# Patient Record
Sex: FEMALE | Race: ASIAN | Employment: OTHER | URBAN - METROPOLITAN AREA
[De-identification: names, ages, dates, MRNs, and addresses within clinical notes are randomized per-mention and may not be internally consistent; named-entity substitution may affect disease eponyms.]

---

## 2017-01-09 ENCOUNTER — TELEPHONE (OUTPATIENT)
Dept: INTERNAL MEDICINE CLINIC | Facility: CLINIC | Age: 52
End: 2017-01-09

## 2017-01-13 ENCOUNTER — APPOINTMENT (OUTPATIENT)
Dept: LAB | Age: 52
End: 2017-01-13
Attending: FAMILY MEDICINE
Payer: COMMERCIAL

## 2017-01-13 ENCOUNTER — OFFICE VISIT (OUTPATIENT)
Dept: INTERNAL MEDICINE CLINIC | Facility: CLINIC | Age: 52
End: 2017-01-13

## 2017-01-13 VITALS
WEIGHT: 100 LBS | HEIGHT: 61 IN | RESPIRATION RATE: 16 BRPM | SYSTOLIC BLOOD PRESSURE: 100 MMHG | BODY MASS INDEX: 18.88 KG/M2 | HEART RATE: 54 BPM | DIASTOLIC BLOOD PRESSURE: 52 MMHG | TEMPERATURE: 98 F

## 2017-01-13 DIAGNOSIS — E55.9 VITAMIN D DEFICIENCY: ICD-10-CM

## 2017-01-13 DIAGNOSIS — Z01.419 ROUTINE GYNECOLOGICAL EXAMINATION: Primary | ICD-10-CM

## 2017-01-13 LAB — 25-HYDROXYVITAMIN D (TOTAL): 37.5 NG/ML (ref 30–100)

## 2017-01-13 PROCEDURE — 99396 PREV VISIT EST AGE 40-64: CPT | Performed by: FAMILY MEDICINE

## 2017-01-13 PROCEDURE — 82306 VITAMIN D 25 HYDROXY: CPT

## 2017-01-13 PROCEDURE — 88175 CYTOPATH C/V AUTO FLUID REDO: CPT | Performed by: FAMILY MEDICINE

## 2017-01-13 PROCEDURE — 87624 HPV HI-RISK TYP POOLED RSLT: CPT | Performed by: FAMILY MEDICINE

## 2017-01-13 NOTE — PATIENT INSTRUCTIONS
The Range of Pap Test Results  When your Pap test is sent to the lab, the lab studies your cell samples and reports any abnormal cell changes. Your health care provider can discuss these changes with you.  In some cases, an abnormal Pap test is due to an · Mild dysplasia: Cells show distinct changes. More testing or HPV typing may be done. You may also have treatment to destroy or remove problem cells. (Reported as low-grade YOSI or WERNER 1.)  · Moderate to severe dysplasia: Cells show precancerous changes.  O

## 2017-01-13 NOTE — PROGRESS NOTES
HPI:    Patient ID: Anshul Li is a 49year old female. HPI Here for pap smear only. Patient had usual annual check-up 4 months ago. Paps have been normal in the past. Due for mammogram and plans to have done soon. No complaints today.    Taking vi

## 2017-01-16 LAB — HPV I/H RISK 1 DNA SPEC QL NAA+PROBE: NEGATIVE

## 2017-01-17 LAB
LAST PAP RESULT: NORMAL
PAP HISTORY (OTHER THAN LAST PAP): NORMAL

## 2017-04-08 ENCOUNTER — HOSPITAL ENCOUNTER (EMERGENCY)
Facility: HOSPITAL | Age: 52
Discharge: HOME | End: 2017-04-08
Attending: EMERGENCY MEDICINE
Payer: COMMERCIAL

## 2017-04-08 ENCOUNTER — APPOINTMENT (OUTPATIENT)
Dept: GENERAL RADIOLOGY | Facility: HOSPITAL | Age: 52
End: 2017-04-08
Attending: EMERGENCY MEDICINE
Payer: COMMERCIAL

## 2017-04-08 VITALS
DIASTOLIC BLOOD PRESSURE: 75 MMHG | HEART RATE: 68 BPM | BODY MASS INDEX: 18.12 KG/M2 | RESPIRATION RATE: 16 BRPM | TEMPERATURE: 98 F | HEIGHT: 61 IN | OXYGEN SATURATION: 97 % | WEIGHT: 96 LBS | SYSTOLIC BLOOD PRESSURE: 128 MMHG

## 2017-04-08 DIAGNOSIS — S29.011A CHEST WALL MUSCLE STRAIN, INITIAL ENCOUNTER: Primary | ICD-10-CM

## 2017-04-08 PROCEDURE — 84484 ASSAY OF TROPONIN QUANT: CPT | Performed by: EMERGENCY MEDICINE

## 2017-04-08 PROCEDURE — 99285 EMERGENCY DEPT VISIT HI MDM: CPT

## 2017-04-08 PROCEDURE — 93010 ELECTROCARDIOGRAM REPORT: CPT

## 2017-04-08 PROCEDURE — 85378 FIBRIN DEGRADE SEMIQUANT: CPT | Performed by: EMERGENCY MEDICINE

## 2017-04-08 PROCEDURE — 93005 ELECTROCARDIOGRAM TRACING: CPT

## 2017-04-08 PROCEDURE — 85025 COMPLETE CBC W/AUTO DIFF WBC: CPT | Performed by: EMERGENCY MEDICINE

## 2017-04-08 PROCEDURE — 36415 COLL VENOUS BLD VENIPUNCTURE: CPT

## 2017-04-08 PROCEDURE — 71101 X-RAY EXAM UNILAT RIBS/CHEST: CPT

## 2017-04-08 RX ORDER — CYCLOBENZAPRINE HCL 10 MG
10 TABLET ORAL 3 TIMES DAILY PRN
Qty: 20 TABLET | Refills: 0 | Status: SHIPPED | OUTPATIENT
Start: 2017-04-08 | End: 2017-04-15

## 2017-04-08 RX ORDER — ASPIRIN 81 MG/1
TABLET, CHEWABLE ORAL DAILY
COMMUNITY
End: 2019-05-24

## 2017-04-08 RX ORDER — IBUPROFEN 600 MG/1
600 TABLET ORAL ONCE
Status: DISCONTINUED | OUTPATIENT
Start: 2017-04-08 | End: 2017-04-08

## 2017-04-08 RX ORDER — RIBOFLAVIN (VITAMIN B2) 100 MG
100 TABLET ORAL DAILY
COMMUNITY

## 2017-04-08 NOTE — ED PROVIDER NOTES
Patient Seen in: [facility] Emergency Department    History   Patient presents with:  Trauma (cardiovascular, musculoskeletal)    Stated Complaint: rib pain    HPI    This is a 15-year-old female who states that she was moving brakes a few days ago Chew by mouth daily. Cyclobenzaprine HCl 10 MG Oral Tab,  Take 1 tablet (10 mg total) by mouth 3 (three) times daily as needed for Muscle spasms. Cholecalciferol (VITAMIN D-3 OR),  Take by mouth. No family history on file.       Smoking Status: Ne D-DIMER - Normal    Narrative:      FEU = Fibrinogen Equivalent Units.     In non-pregnant females:  D-Dimer results of less than 0.5 ug/mL (FEU) have been shown to contribute to  the exclusion of venous thrombolism with a negative predictive value of  ap approximately 600 miles according to her and the some the pain was pleuritic but the pain started even before the drive itself. But workup here is essentially negative she feels comfortable going home she is a low risk otherwise for any acute pathology.

## 2017-04-08 NOTE — ED INITIAL ASSESSMENT (HPI)
PT WAS MOVING SOME BRICKS A FEW DAYS AGO AND FELT A POP IN HER L RIB. PT C/O RIB PAIN AND DIFFICULTY TAKING A DEEP BREATH.

## 2017-12-11 ENCOUNTER — TELEPHONE (OUTPATIENT)
Dept: INTERNAL MEDICINE CLINIC | Facility: CLINIC | Age: 52
End: 2017-12-11

## 2017-12-11 DIAGNOSIS — Z12.39 ENCOUNTER FOR SCREENING FOR MALIGNANT NEOPLASM OF BREAST: ICD-10-CM

## 2017-12-11 DIAGNOSIS — Z12.39 ENCOUNTER FOR OTHER SCREENING FOR MALIGNANT NEOPLASM OF BREAST: Primary | ICD-10-CM

## 2017-12-12 NOTE — TELEPHONE ENCOUNTER
Evert notified. I gave Favian Michael the central scheduling phone number as well. I advised him that there re other locations available to do the mammogram at besides the main hospital.   He thanked us for the call and had no further questions.

## 2017-12-30 ENCOUNTER — HOSPITAL ENCOUNTER (OUTPATIENT)
Dept: MAMMOGRAPHY | Facility: HOSPITAL | Age: 52
Discharge: HOME | End: 2017-12-30
Attending: FAMILY MEDICINE
Payer: COMMERCIAL

## 2017-12-30 DIAGNOSIS — Z12.39 ENCOUNTER FOR SCREENING FOR MALIGNANT NEOPLASM OF BREAST: ICD-10-CM

## 2017-12-30 PROCEDURE — 77067 SCR MAMMO BI INCL CAD: CPT | Performed by: FAMILY MEDICINE

## 2019-05-24 ENCOUNTER — OFFICE VISIT (OUTPATIENT)
Dept: INTERNAL MEDICINE CLINIC | Facility: CLINIC | Age: 54
End: 2019-05-24
Payer: COMMERCIAL

## 2019-05-24 VITALS
BODY MASS INDEX: 18.5 KG/M2 | DIASTOLIC BLOOD PRESSURE: 64 MMHG | SYSTOLIC BLOOD PRESSURE: 110 MMHG | WEIGHT: 98 LBS | HEART RATE: 68 BPM | RESPIRATION RATE: 16 BRPM | HEIGHT: 61 IN

## 2019-05-24 DIAGNOSIS — E55.9 VITAMIN D DEFICIENCY: ICD-10-CM

## 2019-05-24 DIAGNOSIS — Z13.220 SCREENING, LIPID: ICD-10-CM

## 2019-05-24 DIAGNOSIS — Z13.0 SCREENING FOR DEFICIENCY ANEMIA: ICD-10-CM

## 2019-05-24 DIAGNOSIS — Z00.00 ANNUAL PHYSICAL EXAM: Primary | ICD-10-CM

## 2019-05-24 DIAGNOSIS — Z12.39 SCREENING FOR BREAST CANCER: ICD-10-CM

## 2019-05-24 DIAGNOSIS — R12 HEARTBURN: ICD-10-CM

## 2019-05-24 DIAGNOSIS — Z13.1 SCREENING FOR DIABETES MELLITUS: ICD-10-CM

## 2019-05-24 PROCEDURE — 99396 PREV VISIT EST AGE 40-64: CPT | Performed by: FAMILY MEDICINE

## 2019-05-24 RX ORDER — PANTOPRAZOLE SODIUM 20 MG/1
20 TABLET, DELAYED RELEASE ORAL
Qty: 30 TABLET | Refills: 0 | Status: SHIPPED | OUTPATIENT
Start: 2019-05-24 | End: 2019-09-16

## 2019-05-24 NOTE — PROGRESS NOTES
HPI:    Patient ID: Chhaya Pike is a 47year old female. HPI Here for annual check-up. Patient is traveling a lot back and forth from her country to here. Getting her green card here. Has continued to have some intermittent heartburn symptoms.  Ta disturbance. Respiratory: Negative for cough and shortness of breath. Cardiovascular: Negative for chest pain, palpitations and leg swelling. Gastrointestinal: Negative for abdominal pain and constipation.    Endocrine: Negative for polydipsia, polyp deficiency  Screening for breast cancer  Heartburn  1-5. Reviewed preventive health recommendations with patient. Check labs. Encouraged regular exercise and healthy eating. Do mammogram and colonoscopy. 6. Rx PPI for one month.  If symptoms return would

## 2019-05-24 NOTE — PATIENT INSTRUCTIONS
Prevention Guidelines, Women Ages 48 to 59  Screening tests and vaccines are an important part of managing your health. A screening test is done to find possible disorders or diseases in people who don't have any symptoms.  The goal is to find a disease e Chlamydia Women at increased risk for infection At routine exams   Colorectal cancer All women in this age group Flexible sigmoidoscopy every 5 years, or colonoscopy every 10 years, or double-contrast barium enema every 5 years; yearly fecal occult blood t Hepatitis B Women at increased risk for infection – talk with your healthcare provider 3 doses over 6 months; second dose should be given 1 month after the first dose; the third dose should be given at least 2 months after the second dose and at least 4 mo Use of daily aspirin Women ages 54 and up in this age group who are at risk for cardiovascular health problems such as stroke When your risk is known   Use of tobacco and the health effects it can cause All women in this age group Every exam   [address]

## 2019-05-25 ENCOUNTER — LAB ENCOUNTER (OUTPATIENT)
Dept: LAB | Age: 54
End: 2019-05-25
Attending: FAMILY MEDICINE
Payer: COMMERCIAL

## 2019-05-25 DIAGNOSIS — Z13.1 SCREENING FOR DIABETES MELLITUS: ICD-10-CM

## 2019-05-25 DIAGNOSIS — Z13.0 SCREENING FOR DEFICIENCY ANEMIA: ICD-10-CM

## 2019-05-25 DIAGNOSIS — Z13.220 SCREENING, LIPID: ICD-10-CM

## 2019-05-25 DIAGNOSIS — E55.9 VITAMIN D DEFICIENCY: ICD-10-CM

## 2019-05-25 PROCEDURE — 80061 LIPID PANEL: CPT

## 2019-05-25 PROCEDURE — 85025 COMPLETE CBC W/AUTO DIFF WBC: CPT

## 2019-05-25 PROCEDURE — 80053 COMPREHEN METABOLIC PANEL: CPT

## 2019-05-25 PROCEDURE — 82306 VITAMIN D 25 HYDROXY: CPT

## 2019-06-10 ENCOUNTER — APPOINTMENT (OUTPATIENT)
Dept: LAB | Age: 54
End: 2019-06-10
Attending: FAMILY MEDICINE
Payer: COMMERCIAL

## 2019-06-10 ENCOUNTER — OFFICE VISIT (OUTPATIENT)
Dept: INTERNAL MEDICINE CLINIC | Facility: CLINIC | Age: 54
End: 2019-06-10
Payer: COMMERCIAL

## 2019-06-10 VITALS
TEMPERATURE: 97 F | HEIGHT: 61 IN | DIASTOLIC BLOOD PRESSURE: 84 MMHG | SYSTOLIC BLOOD PRESSURE: 116 MMHG | HEART RATE: 64 BPM | BODY MASS INDEX: 18.43 KG/M2 | RESPIRATION RATE: 15 BRPM | WEIGHT: 97.63 LBS

## 2019-06-10 DIAGNOSIS — R74.8 ELEVATED LIVER ENZYMES: Primary | ICD-10-CM

## 2019-06-10 DIAGNOSIS — E78.5 HYPERLIPIDEMIA, UNSPECIFIED HYPERLIPIDEMIA TYPE: ICD-10-CM

## 2019-06-10 DIAGNOSIS — E55.9 VITAMIN D DEFICIENCY: ICD-10-CM

## 2019-06-10 DIAGNOSIS — R74.8 ELEVATED LIVER ENZYMES: ICD-10-CM

## 2019-06-10 PROCEDURE — 80053 COMPREHEN METABOLIC PANEL: CPT | Performed by: FAMILY MEDICINE

## 2019-06-10 PROCEDURE — 99214 OFFICE O/P EST MOD 30 MIN: CPT | Performed by: FAMILY MEDICINE

## 2019-06-10 NOTE — PROGRESS NOTES
HPI:    Patient ID: Oralia Bob is a 47year old female. HPI Here for lab results. Patient has no complaints. Doesn't typically drink a lot of alcohol. Hasn't had elevated liver enzymes in the past that she is aware of. Not taking any supplements. Pulmonary/Chest: Effort normal.   Neurological: She is alert. Psychiatric: She has a normal mood and affect. Her behavior is normal.   Vitals reviewed.              ASSESSMENT/PLAN:   Elevated liver enzymes  (primary encounter diagnosis)  Vitamin d defi

## 2019-06-10 NOTE — PATIENT INSTRUCTIONS
Start taking Vitamin D3 5000IU daily. Recheck vitamin D in 3 months. Decrease saturated fat and increase exercise. Recheck cholesterol in 3 months.    We will let you know your liver enzyme results and what testing we need to do next in the next couple of

## 2019-06-11 DIAGNOSIS — R74.8 ELEVATED LIVER ENZYMES: Primary | ICD-10-CM

## 2019-06-29 ENCOUNTER — APPOINTMENT (OUTPATIENT)
Dept: LAB | Age: 54
End: 2019-06-29
Attending: FAMILY MEDICINE
Payer: COMMERCIAL

## 2019-06-29 DIAGNOSIS — R74.8 ELEVATED LIVER ENZYMES: ICD-10-CM

## 2019-06-29 LAB
ALBUMIN SERPL-MCNC: 4 G/DL (ref 3.4–5)
ALBUMIN/GLOB SERPL: 1 {RATIO} (ref 1–2)
ALP LIVER SERPL-CCNC: 99 U/L (ref 41–108)
ALT SERPL-CCNC: 47 U/L (ref 13–56)
ANION GAP SERPL CALC-SCNC: 5 MMOL/L (ref 0–18)
AST SERPL-CCNC: 34 U/L (ref 15–37)
BILIRUB SERPL-MCNC: 0.4 MG/DL (ref 0.1–2)
BUN BLD-MCNC: 13 MG/DL (ref 7–18)
BUN/CREAT SERPL: 16.9 (ref 10–20)
CALCIUM BLD-MCNC: 9.7 MG/DL (ref 8.5–10.1)
CHLORIDE SERPL-SCNC: 104 MMOL/L (ref 98–112)
CO2 SERPL-SCNC: 29 MMOL/L (ref 21–32)
CREAT BLD-MCNC: 0.77 MG/DL (ref 0.55–1.02)
GLOBULIN PLAS-MCNC: 4.2 G/DL (ref 2.8–4.4)
GLUCOSE BLD-MCNC: 92 MG/DL (ref 70–99)
M PROTEIN MFR SERPL ELPH: 8.2 G/DL (ref 6.4–8.2)
OSMOLALITY SERPL CALC.SUM OF ELEC: 286 MOSM/KG (ref 275–295)
POTASSIUM SERPL-SCNC: 4.5 MMOL/L (ref 3.5–5.1)
SODIUM SERPL-SCNC: 138 MMOL/L (ref 136–145)

## 2019-06-29 PROCEDURE — 80053 COMPREHEN METABOLIC PANEL: CPT | Performed by: FAMILY MEDICINE

## 2019-09-16 ENCOUNTER — OFFICE VISIT (OUTPATIENT)
Dept: SURGERY | Facility: CLINIC | Age: 54
End: 2019-09-16
Payer: COMMERCIAL

## 2019-09-16 VITALS
HEART RATE: 60 BPM | BODY MASS INDEX: 19 KG/M2 | DIASTOLIC BLOOD PRESSURE: 62 MMHG | SYSTOLIC BLOOD PRESSURE: 97 MMHG | WEIGHT: 98 LBS

## 2019-09-16 DIAGNOSIS — Z12.11 SCREENING FOR COLON CANCER: Primary | ICD-10-CM

## 2019-09-16 PROCEDURE — S0285 CNSLT BEFORE SCREEN COLONOSC: HCPCS | Performed by: COLON & RECTAL SURGERY

## 2019-09-16 NOTE — H&P
New Patient Visit Note       Active Problems      1.  Screening for colon cancer        Chief Complaint   Patient presents with:  Colonoscopy: NP. REFERRED BY  FOR COLONOSCOPY CONSULT - no previous exam, denies abd pain, rectal bleeding, or change in making. Trevin Luciano PA-C    I agree with the note as scribed by the PA  I performed my own physical exam and obtained the history as detailed above.   I have made all appropriate changes in documentation as necessary  I attest to the accuracy of thi Spouse name: Not on file      Number of children: Not on file      Years of education: Not on file      Highest education level: Not on file    Tobacco Use      Smoking status: Never Smoker      Smokeless tobacco: Never Used    Substance and Sexual Activ well-nourished. No distress. HENT:   Head: Normocephalic and atraumatic. Nose: Nose normal.   Mouth/Throat: Oropharynx is clear and moist.   Eyes: Pupils are equal, round, and reactive to light. Conjunctivae and EOM are normal. No scleral icterus.    Ne deep cervical adenopathy present. Right: No inguinal and no supraclavicular adenopathy present. Left: No inguinal and no supraclavicular adenopathy present. Neurological: She is alert and oriented to person, place, and time.    Skin: Skin is houses. Clinical examination of the abdomen reveals it to be soft, nondistended, nontender, bowel sounds are normal activity and normal pitch. There is no rebounding tenderness or guarding. There are no signs of ascites or peritonitis.   Liver and sple

## 2019-09-16 NOTE — PATIENT INSTRUCTIONS
Lennox Nguyen is a 20-year-old female who presents today in consultation for a colonoscopy. The patient has never had a colonoscopy in the past and states that she was instructed by her primary care provider that she was due for a colonoscopy.     The with the patient. All questions from the patient were answered in detail. A description of the procedure and it's possible outcomes was fully discussed. The patient seemed to understand the conversation and its details.     Consent for surgery was confir

## 2019-09-17 RX ORDER — POLYETHYLENE GLYCOL 3350, SODIUM CHLORIDE, SODIUM BICARBONATE, POTASSIUM CHLORIDE 420; 11.2; 5.72; 1.48 G/4L; G/4L; G/4L; G/4L
POWDER, FOR SOLUTION ORAL
Qty: 1 BOTTLE | Refills: 0 | Status: SHIPPED | OUTPATIENT
Start: 2019-09-17 | End: 2020-07-06 | Stop reason: ALTCHOICE

## 2019-09-26 ENCOUNTER — TELEPHONE (OUTPATIENT)
Dept: SURGERY | Facility: CLINIC | Age: 54
End: 2019-09-26

## 2019-09-26 NOTE — TELEPHONE ENCOUNTER
Patient's  called with concerns of his wife finishing the trilyte prep.  states that he knows that she will have a difficult time finishing it because she is very \"sensitive to certain tastes. \" Patient's  requested an alternative.  I

## 2019-10-01 PROCEDURE — 88305 TISSUE EXAM BY PATHOLOGIST: CPT | Performed by: COLON & RECTAL SURGERY

## 2019-10-02 ENCOUNTER — LAB REQUISITION (OUTPATIENT)
Dept: LAB | Facility: HOSPITAL | Age: 54
End: 2019-10-02
Payer: COMMERCIAL

## 2019-10-02 DIAGNOSIS — C18.9 MALIGNANT NEOPLASM OF COLON, UNSPECIFIED (HCC): ICD-10-CM

## 2019-10-18 ENCOUNTER — HOSPITAL ENCOUNTER (OUTPATIENT)
Dept: MAMMOGRAPHY | Facility: HOSPITAL | Age: 54
Discharge: HOME | End: 2019-10-18
Attending: FAMILY MEDICINE
Payer: COMMERCIAL

## 2019-10-18 DIAGNOSIS — Z12.39 SCREENING FOR BREAST CANCER: ICD-10-CM

## 2019-10-18 PROCEDURE — 77067 SCR MAMMO BI INCL CAD: CPT | Performed by: FAMILY MEDICINE

## 2019-10-18 PROCEDURE — 77063 BREAST TOMOSYNTHESIS BI: CPT | Performed by: FAMILY MEDICINE

## 2019-10-25 ENCOUNTER — PATIENT OUTREACH (OUTPATIENT)
Dept: SURGERY | Facility: CLINIC | Age: 54
End: 2019-10-25

## 2020-06-23 ENCOUNTER — TELEPHONE (OUTPATIENT)
Dept: INTERNAL MEDICINE CLINIC | Facility: CLINIC | Age: 55
End: 2020-06-23

## 2020-06-23 DIAGNOSIS — E55.9 VITAMIN D DEFICIENCY: ICD-10-CM

## 2020-06-23 DIAGNOSIS — Z13.220 SCREENING, LIPID: ICD-10-CM

## 2020-06-23 DIAGNOSIS — Z00.00 ANNUAL PHYSICAL EXAM: Primary | ICD-10-CM

## 2020-06-23 DIAGNOSIS — E78.5 HYPERLIPIDEMIA, UNSPECIFIED HYPERLIPIDEMIA TYPE: ICD-10-CM

## 2020-06-23 DIAGNOSIS — Z13.228 SCREENING FOR METABOLIC DISORDER: ICD-10-CM

## 2020-06-23 DIAGNOSIS — Z13.0 SCREENING FOR DEFICIENCY ANEMIA: ICD-10-CM

## 2020-06-23 NOTE — TELEPHONE ENCOUNTER
Future Appointments   Date Time Provider [facility]   7/8/2020  4:30 PM Charline Hinds, DO [facility]     For CPE      Pt would like fasting labs sent to [facility] pls. Pt aware to complete 5-7 days ahead.

## 2020-07-06 ENCOUNTER — TELEPHONE (OUTPATIENT)
Dept: INTERNAL MEDICINE CLINIC | Facility: CLINIC | Age: 55
End: 2020-07-06

## 2020-07-06 NOTE — TELEPHONE ENCOUNTER
Pt's  called to say Pt has had  left side under and around breast pain, been going on for a week.      Future Appointments   Date Time Provider [facility]   7/7/2020 11:00 AM Everardo Herrera, DO [facility]     For CPE and breast/ches

## 2020-07-06 NOTE — TELEPHONE ENCOUNTER
Pt's  called back to ask us to cancel her appt with [facility] 7/7/2020    They will be going to a new internist. They were able to get her in today     Not sure if she will come back to [facility].

## 2022-06-13 ENCOUNTER — TELEPHONE (OUTPATIENT)
Dept: INTERNAL MEDICINE CLINIC | Facility: CLINIC | Age: 57
End: 2022-06-13

## 2022-06-13 NOTE — TELEPHONE ENCOUNTER
calling for wife who is positive for covid. Symptoms started on 6/10/22 headaches, congestion, cough, body aches. Tested positive on 6/12/22. Pt vaccinated with one booster.  requesting medication or recommendations on how to treat at home.

## 2022-06-13 NOTE — TELEPHONE ENCOUNTER
MONTSE    Pt phone, vmail not setup yet. Called Peter's phone,  on HIPPA. Spoke to Delilah. Advised of OTC recommendations for cough/discomfort, isolation requirements, rest, and hydration. Warning signs given. Pt intially had symptoms below- currently has cough only.  states improving from symptom onset 6/10.

## 2023-03-20 ENCOUNTER — TELEPHONE (OUTPATIENT)
Dept: INTERNAL MEDICINE CLINIC | Facility: CLINIC | Age: 58
End: 2023-03-20

## 2023-03-20 NOTE — TELEPHONE ENCOUNTER
Called and spoke to pt and , Frank Beck. Per pt, she woke up with some dizziness this morning. Would occur with position changes or when moving head quickly. Has had this in the past, and previously had nausea/HA, but this did not occur this time. No current sxs, will come/go. Offered sooner appt tomorrow.  asked for something Wednesday as they cannot make it tomorrow. Appt moved to Wednesday at 3:45.      FYI to [facility]

## 2023-03-20 NOTE — TELEPHONE ENCOUNTER
Pt's spouse (peter- on hipaa) stated pt woke with dizziness.   Lawanda Ibrahim stated there may be a language barrier and requesting we call back after 4pm when he will be with her    Pt scheduled on below with [facility] to reestablish care    Future Appointments   Date Time Provider [facility]   3/27/2023  3:30 PM Marciano Christianson DO [facility]

## 2023-03-22 ENCOUNTER — OFFICE VISIT (OUTPATIENT)
Dept: INTERNAL MEDICINE CLINIC | Facility: CLINIC | Age: 58
End: 2023-03-22
Payer: COMMERCIAL

## 2023-03-22 VITALS
DIASTOLIC BLOOD PRESSURE: 64 MMHG | WEIGHT: 101 LBS | OXYGEN SATURATION: 100 % | HEIGHT: 60 IN | SYSTOLIC BLOOD PRESSURE: 120 MMHG | BODY MASS INDEX: 19.83 KG/M2 | HEART RATE: 69 BPM

## 2023-03-22 DIAGNOSIS — Z13.0 SCREENING FOR DEFICIENCY ANEMIA: ICD-10-CM

## 2023-03-22 DIAGNOSIS — H81.11 BENIGN PAROXYSMAL POSITIONAL VERTIGO OF RIGHT EAR: Primary | ICD-10-CM

## 2023-03-22 DIAGNOSIS — Z13.220 SCREENING, LIPID: ICD-10-CM

## 2023-03-22 DIAGNOSIS — Z13.1 SCREENING FOR DIABETES MELLITUS: ICD-10-CM

## 2023-03-22 DIAGNOSIS — Z12.11 SCREENING FOR MALIGNANT NEOPLASM OF COLON: ICD-10-CM

## 2023-03-22 DIAGNOSIS — E55.9 VITAMIN D DEFICIENCY: ICD-10-CM

## 2023-03-22 DIAGNOSIS — D12.6 ADENOMATOUS POLYP OF COLON, UNSPECIFIED PART OF COLON: ICD-10-CM

## 2023-03-22 PROCEDURE — 3008F BODY MASS INDEX DOCD: CPT | Performed by: FAMILY MEDICINE

## 2023-03-22 PROCEDURE — 3078F DIAST BP <80 MM HG: CPT | Performed by: FAMILY MEDICINE

## 2023-03-22 PROCEDURE — 99204 OFFICE O/P NEW MOD 45 MIN: CPT | Performed by: FAMILY MEDICINE

## 2023-03-22 PROCEDURE — 3074F SYST BP LT 130 MM HG: CPT | Performed by: FAMILY MEDICINE

## 2023-03-22 RX ORDER — MECLIZINE HYDROCHLORIDE 25 MG/1
25 TABLET ORAL 3 TIMES DAILY PRN
Qty: 30 TABLET | Refills: 0 | Status: SHIPPED | OUTPATIENT
Start: 2023-03-22 | End: 2023-03-24

## 2023-03-23 ENCOUNTER — TELEPHONE (OUTPATIENT)
Dept: INTERNAL MEDICINE CLINIC | Facility: CLINIC | Age: 58
End: 2023-03-23

## 2023-03-24 RX ORDER — MECLIZINE HYDROCHLORIDE 25 MG/1
25 TABLET ORAL 3 TIMES DAILY PRN
Qty: 30 TABLET | Refills: 0 | Status: SHIPPED | OUTPATIENT
Start: 2023-03-24

## 2023-03-24 NOTE — TELEPHONE ENCOUNTER
Last visit- 03/22/2023 benign paroxysmal positional vertigo of right ear    Last refill- 03/22/2023 meclizine 25mg QTY30 0R    Last labs- no recent labs   Future Appointments   Date Time Provider [facility]   4/12/2023  1:30 PM Cheo Pinon DO [facility]

## 2023-04-10 ENCOUNTER — LAB ENCOUNTER (OUTPATIENT)
Dept: LAB | Age: 58
End: 2023-04-10
Attending: FAMILY MEDICINE
Payer: COMMERCIAL

## 2023-04-10 DIAGNOSIS — Z13.1 SCREENING FOR DIABETES MELLITUS: ICD-10-CM

## 2023-04-10 DIAGNOSIS — Z13.0 SCREENING FOR DEFICIENCY ANEMIA: ICD-10-CM

## 2023-04-10 DIAGNOSIS — Z13.220 SCREENING, LIPID: ICD-10-CM

## 2023-04-10 DIAGNOSIS — E55.9 VITAMIN D DEFICIENCY: ICD-10-CM

## 2023-04-10 DIAGNOSIS — R73.01 IMPAIRED FASTING GLUCOSE: ICD-10-CM

## 2023-04-10 LAB
ALBUMIN SERPL-MCNC: 3.9 G/DL (ref 3.4–5)
ALBUMIN/GLOB SERPL: 0.9 {RATIO} (ref 1–2)
ALP LIVER SERPL-CCNC: 94 U/L
ALT SERPL-CCNC: 57 U/L
ANION GAP SERPL CALC-SCNC: 7 MMOL/L (ref 0–18)
AST SERPL-CCNC: 48 U/L (ref 15–37)
BASOPHILS # BLD AUTO: 0.02 X10(3) UL (ref 0–0.2)
BASOPHILS NFR BLD AUTO: 0.3 %
BILIRUB SERPL-MCNC: 0.6 MG/DL (ref 0.1–2)
BUN BLD-MCNC: 12 MG/DL (ref 7–18)
CALCIUM BLD-MCNC: 9.7 MG/DL (ref 8.5–10.1)
CHLORIDE SERPL-SCNC: 107 MMOL/L (ref 98–112)
CHOLEST SERPL-MCNC: 297 MG/DL (ref ?–200)
CO2 SERPL-SCNC: 28 MMOL/L (ref 21–32)
CREAT BLD-MCNC: 0.81 MG/DL
EOSINOPHIL # BLD AUTO: 0.1 X10(3) UL (ref 0–0.7)
EOSINOPHIL NFR BLD AUTO: 1.6 %
ERYTHROCYTE [DISTWIDTH] IN BLOOD BY AUTOMATED COUNT: 13.3 %
FASTING PATIENT LIPID ANSWER: YES
FASTING STATUS PATIENT QL REPORTED: YES
GFR SERPLBLD BASED ON 1.73 SQ M-ARVRAT: 85 ML/MIN/1.73M2 (ref 60–?)
GLOBULIN PLAS-MCNC: 4.5 G/DL (ref 2.8–4.4)
GLUCOSE BLD-MCNC: 113 MG/DL (ref 70–99)
HCT VFR BLD AUTO: 46.3 %
HDLC SERPL-MCNC: 114 MG/DL (ref 40–59)
HGB BLD-MCNC: 14.7 G/DL
IMM GRANULOCYTES # BLD AUTO: 0.01 X10(3) UL (ref 0–1)
IMM GRANULOCYTES NFR BLD: 0.2 %
LDLC SERPL CALC-MCNC: 159 MG/DL (ref ?–100)
LYMPHOCYTES # BLD AUTO: 3.32 X10(3) UL (ref 1–4)
LYMPHOCYTES NFR BLD AUTO: 53 %
MCH RBC QN AUTO: 29.6 PG (ref 26–34)
MCHC RBC AUTO-ENTMCNC: 31.7 G/DL (ref 31–37)
MCV RBC AUTO: 93.2 FL
MONOCYTES # BLD AUTO: 0.41 X10(3) UL (ref 0.1–1)
MONOCYTES NFR BLD AUTO: 6.5 %
NEUTROPHILS # BLD AUTO: 2.41 X10 (3) UL (ref 1.5–7.7)
NEUTROPHILS # BLD AUTO: 2.41 X10(3) UL (ref 1.5–7.7)
NEUTROPHILS NFR BLD AUTO: 38.4 %
NONHDLC SERPL-MCNC: 183 MG/DL (ref ?–130)
OSMOLALITY SERPL CALC.SUM OF ELEC: 295 MOSM/KG (ref 275–295)
PLATELET # BLD AUTO: 234 10(3)UL (ref 150–450)
POTASSIUM SERPL-SCNC: 4.6 MMOL/L (ref 3.5–5.1)
PROT SERPL-MCNC: 8.4 G/DL (ref 6.4–8.2)
RBC # BLD AUTO: 4.97 X10(6)UL
SODIUM SERPL-SCNC: 142 MMOL/L (ref 136–145)
TRIGL SERPL-MCNC: 141 MG/DL (ref 30–149)
VIT D+METAB SERPL-MCNC: 33 NG/ML (ref 30–100)
VLDLC SERPL CALC-MCNC: 27 MG/DL (ref 0–30)
WBC # BLD AUTO: 6.3 X10(3) UL (ref 4–11)

## 2023-04-10 PROCEDURE — 82306 VITAMIN D 25 HYDROXY: CPT | Performed by: FAMILY MEDICINE

## 2023-04-10 PROCEDURE — 85025 COMPLETE CBC W/AUTO DIFF WBC: CPT | Performed by: FAMILY MEDICINE

## 2023-04-10 PROCEDURE — 83036 HEMOGLOBIN GLYCOSYLATED A1C: CPT | Performed by: FAMILY MEDICINE

## 2023-04-10 PROCEDURE — 80053 COMPREHEN METABOLIC PANEL: CPT | Performed by: FAMILY MEDICINE

## 2023-04-10 PROCEDURE — 80061 LIPID PANEL: CPT | Performed by: FAMILY MEDICINE

## 2023-04-11 DIAGNOSIS — R73.01 IMPAIRED FASTING GLUCOSE: Primary | ICD-10-CM

## 2023-04-11 LAB
EST. AVERAGE GLUCOSE BLD GHB EST-MCNC: 131 MG/DL (ref 68–126)
HBA1C MFR BLD: 6.2 % (ref ?–5.7)

## 2023-04-12 ENCOUNTER — LAB ENCOUNTER (OUTPATIENT)
Dept: LAB | Age: 58
End: 2023-04-12
Attending: FAMILY MEDICINE
Payer: COMMERCIAL

## 2023-04-12 ENCOUNTER — OFFICE VISIT (OUTPATIENT)
Dept: INTERNAL MEDICINE CLINIC | Facility: CLINIC | Age: 58
End: 2023-04-12
Payer: COMMERCIAL

## 2023-04-12 VITALS
HEIGHT: 60 IN | DIASTOLIC BLOOD PRESSURE: 60 MMHG | WEIGHT: 98.63 LBS | HEART RATE: 68 BPM | SYSTOLIC BLOOD PRESSURE: 118 MMHG | BODY MASS INDEX: 19.36 KG/M2 | OXYGEN SATURATION: 100 %

## 2023-04-12 DIAGNOSIS — Z00.00 ANNUAL PHYSICAL EXAM: Primary | ICD-10-CM

## 2023-04-12 DIAGNOSIS — E78.00 PURE HYPERCHOLESTEROLEMIA: ICD-10-CM

## 2023-04-12 DIAGNOSIS — Z12.4 SCREENING FOR CERVICAL CANCER: ICD-10-CM

## 2023-04-12 DIAGNOSIS — R74.8 ELEVATED LIVER ENZYMES: ICD-10-CM

## 2023-04-12 DIAGNOSIS — R73.02 IMPAIRED GLUCOSE TOLERANCE: ICD-10-CM

## 2023-04-12 DIAGNOSIS — Z12.11 SCREENING FOR MALIGNANT NEOPLASM OF COLON: ICD-10-CM

## 2023-04-12 DIAGNOSIS — Z12.31 ENCOUNTER FOR SCREENING MAMMOGRAM FOR MALIGNANT NEOPLASM OF BREAST: ICD-10-CM

## 2023-04-12 LAB
HBV CORE AB SERPL QL IA: REACTIVE
HBV SURFACE AB SER QL: REACTIVE
HBV SURFACE AB SERPL IA-ACNC: 630.09 MIU/ML
HBV SURFACE AG SER-ACNC: <0.1 [IU]/L
HBV SURFACE AG SERPL QL IA: NONREACTIVE
HCV AB SERPL QL IA: NONREACTIVE
IRON SATN MFR SERPL: 27 %
IRON SERPL-MCNC: 95 UG/DL
TIBC SERPL-MCNC: 352 UG/DL (ref 240–450)
TRANSFERRIN SERPL-MCNC: 236 MG/DL (ref 200–360)
TSI SER-ACNC: 0.87 MIU/ML (ref 0.36–3.74)

## 2023-04-12 PROCEDURE — 3074F SYST BP LT 130 MM HG: CPT | Performed by: FAMILY MEDICINE

## 2023-04-12 PROCEDURE — 90750 HZV VACC RECOMBINANT IM: CPT | Performed by: FAMILY MEDICINE

## 2023-04-12 PROCEDURE — 86803 HEPATITIS C AB TEST: CPT | Performed by: FAMILY MEDICINE

## 2023-04-12 PROCEDURE — 99396 PREV VISIT EST AGE 40-64: CPT | Performed by: FAMILY MEDICINE

## 2023-04-12 PROCEDURE — 83550 IRON BINDING TEST: CPT | Performed by: FAMILY MEDICINE

## 2023-04-12 PROCEDURE — 86704 HEP B CORE ANTIBODY TOTAL: CPT | Performed by: FAMILY MEDICINE

## 2023-04-12 PROCEDURE — 99213 OFFICE O/P EST LOW 20 MIN: CPT | Performed by: FAMILY MEDICINE

## 2023-04-12 PROCEDURE — 3078F DIAST BP <80 MM HG: CPT | Performed by: FAMILY MEDICINE

## 2023-04-12 PROCEDURE — 87624 HPV HI-RISK TYP POOLED RSLT: CPT | Performed by: FAMILY MEDICINE

## 2023-04-12 PROCEDURE — 3008F BODY MASS INDEX DOCD: CPT | Performed by: FAMILY MEDICINE

## 2023-04-12 PROCEDURE — 87340 HEPATITIS B SURFACE AG IA: CPT | Performed by: FAMILY MEDICINE

## 2023-04-12 PROCEDURE — 84443 ASSAY THYROID STIM HORMONE: CPT | Performed by: FAMILY MEDICINE

## 2023-04-12 PROCEDURE — 83540 ASSAY OF IRON: CPT | Performed by: FAMILY MEDICINE

## 2023-04-12 PROCEDURE — 86706 HEP B SURFACE ANTIBODY: CPT | Performed by: FAMILY MEDICINE

## 2023-04-12 PROCEDURE — 90471 IMMUNIZATION ADMIN: CPT | Performed by: FAMILY MEDICINE

## 2023-04-13 LAB — HPV I/H RISK 1 DNA SPEC QL NAA+PROBE: NEGATIVE

## 2023-04-14 ENCOUNTER — TELEPHONE (OUTPATIENT)
Dept: INTERNAL MEDICINE CLINIC | Facility: CLINIC | Age: 58
End: 2023-04-14

## 2023-04-14 NOTE — TELEPHONE ENCOUNTER
Please call patient's spouse, Ileana Medina at [phone] around [place] [address].  Wife does not speak much [place].

## 2023-04-19 NOTE — TELEPHONE ENCOUNTER
Patient's  calling [phone] to ask several questions:    Lisa Hi asking if unprotected sex is ok, should pt be seeing a GI MD to check for liver damage, asking if pt can drink alcohol, other family members to have Hep B  Vaccines? Per Alexandre Bowen notified ok for unprotected sex is ok, see [facility] MD to f/u, no alcohol until seen by GI and addressed and other family members PCP's will need to be called to address questions about them in this situation, possibly their MD's will want to draw labs etc. Gerald San Fidel understanding.

## 2023-06-13 ENCOUNTER — HOSPITAL ENCOUNTER (OUTPATIENT)
Dept: MAMMOGRAPHY | Facility: HOSPITAL | Age: 58
Discharge: HOME | End: 2023-06-13
Attending: FAMILY MEDICINE
Payer: COMMERCIAL

## 2023-06-13 DIAGNOSIS — Z12.31 ENCOUNTER FOR SCREENING MAMMOGRAM FOR MALIGNANT NEOPLASM OF BREAST: ICD-10-CM

## 2023-06-13 PROCEDURE — 77063 BREAST TOMOSYNTHESIS BI: CPT | Performed by: FAMILY MEDICINE

## 2023-06-13 PROCEDURE — 77067 SCR MAMMO BI INCL CAD: CPT | Performed by: FAMILY MEDICINE

## 2023-08-10 ENCOUNTER — TELEPHONE (OUTPATIENT)
Dept: INTERNAL MEDICINE CLINIC | Facility: CLINIC | Age: 58
End: 2023-08-10

## 2023-08-10 DIAGNOSIS — Z23 NEED FOR SHINGLES VACCINE: Primary | ICD-10-CM

## 2023-08-10 NOTE — TELEPHONE ENCOUNTER
Future Appointments   Date Time Provider [facility]ibel [facility]   8/14/2023  8:30 AM [facility]     Pt coming for #2 shingles inj-please enter order

## 2023-08-14 ENCOUNTER — NURSE ONLY (OUTPATIENT)
Dept: INTERNAL MEDICINE CLINIC | Facility: CLINIC | Age: 58
End: 2023-08-14
Payer: COMMERCIAL

## 2023-08-14 PROCEDURE — 90471 IMMUNIZATION ADMIN: CPT | Performed by: FAMILY MEDICINE

## 2023-08-14 PROCEDURE — 90750 HZV VACC RECOMBINANT IM: CPT | Performed by: FAMILY MEDICINE

## 2024-10-15 ENCOUNTER — NURSE TRIAGE (OUTPATIENT)
Dept: INTERNAL MEDICINE CLINIC | Facility: CLINIC | Age: 59
End: 2024-10-15

## 2024-10-15 NOTE — TELEPHONE ENCOUNTER
Called and spoke to pt's spouse, Evert (on HIPAA). Offered appointment tomorrow morning with Jasmina Centeno or can be seen in [facility] today. Patient would like to come to see Jasmina Centeno tomorrow, appointment scheduled.

## 2024-10-15 NOTE — TELEPHONE ENCOUNTER
Action Requested: Summary for Provider     []  Critical Lab, Recommendations Needed  [] Need Additional Advice  []   FYI    []   Need Orders  [] Need Medications Sent to Pharmacy  []  Other     SUMMARY: sore throat  Triage call transferred.   Spoke with pt's  indicating pt c/o sore throat since yesterday. No availability with PCP today. Advised to take pt to [facility]. Per  would like pt to see another provider in clinic. Informed PCP prefers to see own pt and will send message to inquire if able to accommodate pt today or further recommendations.    verbalized understanding and agreed with POC.    Please advise. Thank you.    Reason for Disposition   Patient wants to be seen    Answer Assessment - Initial Assessment Questions  1. ONSET: \"When did the throat start hurting?\" (Hours or days ago)       10/14/24  2. SEVERITY: \"How bad is the sore throat?\" (Scale 1-10; mild, moderate or severe)    - MILD (1-3):  Doesn't interfere with eating or normal activities.    - MODERATE (4-7): Interferes with eating some solids and normal activities.    - SEVERE (8-10):  Excruciating pain, interferes with most normal activities.    - SEVERE WITH DYSPHAGIA (10): Can't swallow liquids, drooling.        moderate  3. STREP EXPOSURE: \"Has there been any exposure to strep within the past week?\" If Yes, ask: \"What type of contact occurred?\"        No   4.  VIRAL SYMPTOMS: \"Are there any symptoms of a cold, such as a runny nose, cough, hoarse voice or red eyes?\"        No   5. FEVER: \"Do you have a fever?\" If Yes, ask: \"What is your temperature, how was it measured, and when did it start?\"      No   6. PUS ON THE TONSILS: \"Is there pus on the tonsils in the back of your throat?\"       No   7. OTHER SYMPTOMS: \"Do you have any other symptoms?\" (e.g., difficulty breathing, headache, rash)      No   8. PREGNANCY: \"Is there any chance you are pregnant?\" \"When was your last menstrual period?\"      No    Protocols used: Sore  Throat-A-OH

## 2024-10-16 ENCOUNTER — OFFICE VISIT (OUTPATIENT)
Dept: INTERNAL MEDICINE CLINIC | Facility: CLINIC | Age: 59
End: 2024-10-16
Payer: COMMERCIAL

## 2024-10-16 VITALS
SYSTOLIC BLOOD PRESSURE: 110 MMHG | DIASTOLIC BLOOD PRESSURE: 80 MMHG | WEIGHT: 97.63 LBS | RESPIRATION RATE: 21 BRPM | TEMPERATURE: 98 F | HEIGHT: 60 IN | HEART RATE: 76 BPM | OXYGEN SATURATION: 100 % | BODY MASS INDEX: 19.17 KG/M2

## 2024-10-16 DIAGNOSIS — Z23 NEED FOR VACCINATION: ICD-10-CM

## 2024-10-16 DIAGNOSIS — Z12.11 COLON CANCER SCREENING: ICD-10-CM

## 2024-10-16 DIAGNOSIS — Z12.31 ENCOUNTER FOR SCREENING MAMMOGRAM FOR MALIGNANT NEOPLASM OF BREAST: ICD-10-CM

## 2024-10-16 DIAGNOSIS — B34.9 VIRAL ILLNESS: Primary | ICD-10-CM

## 2024-10-16 NOTE — PROGRESS NOTES
Vickie Wesley is a 59 year old female.   Chief Complaint   Patient presents with    Sore Throat     Yb rm  15  sore throat runny nose only on left  clear  3 days for sore throat        HPI:    Patient here today with her  for evaluation of runny nose for last 10 days and onset of sore throat 3 days ago. Patient denies body aches, chills, fever, fatigue. Does feel drainage in back of throat. Tolerating PO ok. Has not taken any over the counter medication as symptoms have been mild. No headaches, ear pain, hearing changes reported.     Due for mammogram and colonoscopy. Due for annual health visit.  Allergies:  Allergies[1]   Current Meds:  Current Outpatient Medications   Medication Sig Dispense Refill    meclizine 25 MG Oral Tab Take 1 tablet (25 mg total) by mouth 3 (three) times daily as needed. (Patient not taking: Reported on 10/16/2024) 30 tablet 0        PMH:     Past Medical History:    Abdominal pain, epigastric    Acute appendicitis without mention of peritonitis    Candidiasis of mouth    Contact dermatitis and other eczema, due to unspecified cause    Disorder of bone and cartilage, unspecified    GERD (gastroesophageal reflux disease)    Impaired glucose tolerance test    Osteopenia    Peptic ulcer disease    Peptic ulcer disease    Pure hypercholesterolemia    Ranula of floor of mouth    Routine general medical examination at a health care facility    Routine gynecological examination    Special screening for malignant neoplasms, colon    Special screening for malignant neoplasms, colon    Tachypnea    Ulcer    Vertigo       ROS:   Review of Systems   Constitutional: Negative.    HENT:  Positive for postnasal drip, rhinorrhea and sore throat.    Cardiovascular: Negative.    Gastrointestinal: Negative.    Neurological: Negative.         PHYSICAL EXAM:    /80   Pulse 76   Temp 98 °F (36.7 °C) (Temporal)   Resp 21   Ht 5' (1.524 m)   Wt 97 lb 9.6 oz (44.3 kg)   SpO2 100%   BMI 19.06  kg/m²   Physical Exam  Constitutional:       Appearance: Normal appearance.   HENT:      Right Ear: Tympanic membrane normal.      Left Ear: Tympanic membrane normal.      Nose: Rhinorrhea present.      Mouth/Throat:      Mouth: Mucous membranes are moist.      Pharynx: Posterior oropharyngeal erythema present.   Eyes:      Extraocular Movements: Extraocular movements intact.      Conjunctiva/sclera: Conjunctivae normal.      Pupils: Pupils are equal, round, and reactive to light.   Pulmonary:      Effort: Pulmonary effort is normal.      Breath sounds: Normal breath sounds.   Lymphadenopathy:      Cervical: No cervical adenopathy.   Skin:     General: Skin is warm and dry.      Findings: No lesion.   Neurological:      Mental Status: She is alert.        ASSESSMENT/ PLAN:   1. Viral illness  Discussed over the counter supportive measures. Flnoase daily, can consider adding antihistamine. Increase hydration. Warm fluids for through irritation. Follow up if symptoms fail to improve.     2. Colon cancer screening  - Gastro Referral - In Network    3. Encounter for screening mammogram for malignant neoplasm of breast  - [facility] 2D+3D SCREENING BILAT (CPT=77067/51409); Future    4. Need for vaccination  - Fluzone trivalent vaccine, PF 0.5mL, 6mo+ (60365)    Health Maintenance Due   Topic Date Due    Colorectal Cancer Screening  10/01/2022    Annual Depression Screening  01/01/2024    Annual Physical  04/12/2024    Mammogram  06/13/2024    COVID-19 Vaccine (4 - 2023-24 season) 09/01/2024    Influenza Vaccine (1) 10/01/2024           Pt indicates understanding and agrees to the plan.     No follow-ups on file.    BETHANY Santos          [1] No Known Allergies

## 2025-04-28 ENCOUNTER — HOSPITAL ENCOUNTER (EMERGENCY)
Facility: HOSPITAL | Age: 60
Discharge: HOME | End: 2025-04-29
Attending: EMERGENCY MEDICINE
Payer: COMMERCIAL

## 2025-04-28 DIAGNOSIS — R10.9 ABDOMINAL PAIN OF UNKNOWN ETIOLOGY: Primary | ICD-10-CM

## 2025-04-28 DIAGNOSIS — E87.6 HYPOKALEMIA: ICD-10-CM

## 2025-04-28 PROCEDURE — 36415 COLL VENOUS BLD VENIPUNCTURE: CPT

## 2025-04-28 PROCEDURE — 99285 EMERGENCY DEPT VISIT HI MDM: CPT

## 2025-04-28 PROCEDURE — 99284 EMERGENCY DEPT VISIT MOD MDM: CPT

## 2025-04-28 PROCEDURE — 93010 ELECTROCARDIOGRAM REPORT: CPT

## 2025-04-28 PROCEDURE — 93005 ELECTROCARDIOGRAM TRACING: CPT

## 2025-04-29 ENCOUNTER — APPOINTMENT (OUTPATIENT)
Dept: CT IMAGING | Facility: HOSPITAL | Age: 60
End: 2025-04-29
Attending: EMERGENCY MEDICINE
Payer: COMMERCIAL

## 2025-04-29 VITALS
SYSTOLIC BLOOD PRESSURE: 131 MMHG | OXYGEN SATURATION: 100 % | TEMPERATURE: 98 F | HEART RATE: 63 BPM | RESPIRATION RATE: 20 BRPM | WEIGHT: 98 LBS | HEIGHT: 61 IN | BODY MASS INDEX: 18.5 KG/M2 | DIASTOLIC BLOOD PRESSURE: 74 MMHG

## 2025-04-29 LAB
ALBUMIN SERPL-MCNC: 4.7 G/DL (ref 3.2–4.8)
ALBUMIN/GLOB SERPL: 1.4 {RATIO} (ref 1–2)
ALP LIVER SERPL-CCNC: 78 U/L (ref 46–118)
ALT SERPL-CCNC: 21 U/L (ref 10–49)
ANION GAP SERPL CALC-SCNC: 5 MMOL/L (ref 0–18)
AST SERPL-CCNC: 24 U/L (ref ?–34)
ATRIAL RATE: 61 BPM
BASOPHILS # BLD AUTO: 0.02 X10(3) UL (ref 0–0.2)
BASOPHILS NFR BLD AUTO: 0.2 %
BILIRUB SERPL-MCNC: 0.5 MG/DL (ref 0.3–1.2)
BUN BLD-MCNC: 11 MG/DL (ref 9–23)
CALCIUM BLD-MCNC: 11.9 MG/DL (ref 8.7–10.6)
CHLORIDE SERPL-SCNC: 105 MMOL/L (ref 98–112)
CO2 SERPL-SCNC: 29 MMOL/L (ref 21–32)
CREAT BLD-MCNC: 0.83 MG/DL (ref 0.55–1.02)
EGFRCR SERPLBLD CKD-EPI 2021: 82 ML/MIN/1.73M2 (ref 60–?)
EOSINOPHIL # BLD AUTO: 0.13 X10(3) UL (ref 0–0.7)
EOSINOPHIL NFR BLD AUTO: 1.5 %
ERYTHROCYTE [DISTWIDTH] IN BLOOD BY AUTOMATED COUNT: 12.6 %
GLOBULIN PLAS-MCNC: 3.4 G/DL (ref 2–3.5)
GLUCOSE BLD-MCNC: 111 MG/DL (ref 70–99)
HCT VFR BLD AUTO: 41.7 % (ref 35–48)
HGB BLD-MCNC: 13.7 G/DL (ref 12–16)
IMM GRANULOCYTES # BLD AUTO: 0.01 X10(3) UL (ref 0–1)
IMM GRANULOCYTES NFR BLD: 0.1 %
LIPASE SERPL-CCNC: 46 U/L (ref 12–53)
LYMPHOCYTES # BLD AUTO: 4.09 X10(3) UL (ref 1–4)
LYMPHOCYTES NFR BLD AUTO: 48.7 %
MCH RBC QN AUTO: 29.3 PG (ref 26–34)
MCHC RBC AUTO-ENTMCNC: 32.9 G/DL (ref 31–37)
MCV RBC AUTO: 89.3 FL (ref 80–100)
MONOCYTES # BLD AUTO: 0.61 X10(3) UL (ref 0.1–1)
MONOCYTES NFR BLD AUTO: 7.3 %
NEUTROPHILS # BLD AUTO: 3.54 X10 (3) UL (ref 1.5–7.7)
NEUTROPHILS # BLD AUTO: 3.54 X10(3) UL (ref 1.5–7.7)
NEUTROPHILS NFR BLD AUTO: 42.2 %
OSMOLALITY SERPL CALC.SUM OF ELEC: 288 MOSM/KG (ref 275–295)
P AXIS: 78 DEGREES
P-R INTERVAL: 130 MS
PLATELET # BLD AUTO: 237 10(3)UL (ref 150–450)
POTASSIUM SERPL-SCNC: 3.4 MMOL/L (ref 3.5–5.1)
PROT SERPL-MCNC: 8.1 G/DL (ref 5.7–8.2)
Q-T INTERVAL: 420 MS
QRS DURATION: 78 MS
QTC CALCULATION (BEZET): 422 MS
R AXIS: 62 DEGREES
RBC # BLD AUTO: 4.67 X10(6)UL (ref 3.8–5.3)
SODIUM SERPL-SCNC: 139 MMOL/L (ref 136–145)
T AXIS: 48 DEGREES
VENTRICULAR RATE: 61 BPM
WBC # BLD AUTO: 8.4 X10(3) UL (ref 4–11)

## 2025-04-29 PROCEDURE — 85025 COMPLETE CBC W/AUTO DIFF WBC: CPT | Performed by: EMERGENCY MEDICINE

## 2025-04-29 PROCEDURE — 74177 CT ABD & PELVIS W/CONTRAST: CPT | Performed by: EMERGENCY MEDICINE

## 2025-04-29 PROCEDURE — 80053 COMPREHEN METABOLIC PANEL: CPT | Performed by: EMERGENCY MEDICINE

## 2025-04-29 PROCEDURE — 83690 ASSAY OF LIPASE: CPT | Performed by: EMERGENCY MEDICINE

## 2025-04-29 RX ORDER — OMEPRAZOLE 40 MG/1
40 CAPSULE, DELAYED RELEASE ORAL DAILY
Qty: 30 CAPSULE | Refills: 0 | Status: SHIPPED | OUTPATIENT
Start: 2025-04-29 | End: 2025-05-29

## 2025-04-29 RX ORDER — DICYCLOMINE HCL 20 MG
20 TABLET ORAL 3 TIMES DAILY
Qty: 20 TABLET | Refills: 0 | Status: SHIPPED | OUTPATIENT
Start: 2025-04-29

## 2025-04-29 NOTE — ED INITIAL ASSESSMENT (HPI)
Pt to ED for c/o epigastric pain with hives and rashes onset 2000 after eating dinner. Pt took 2 TUMS - no relief. Denies N/V.

## 2025-04-29 NOTE — ED PROVIDER NOTES
Patient Seen in: [facility] Emergency Department      History     Chief Complaint   Patient presents with    Abdomen/Flank Pain     Stated Complaint: EPIGASTRIC PAIN STARTING TONIGHT    Subjective:   HPI    Patient is a 59-year-old female history of appendectomy, peptic ulcer presents to ED for evaluation of abdominal pain.  Started with colicky abdominal pain that would last for about 4 to 5 minutes and then go away for about 10 minutes and then returned over the course of a couple hours.  Decided to come to the ED at 10 PM.  No fever, vomiting or diarrhea.  Patient denies urinary symptoms.  No history of gallstones.  She denies any pain currently  History of Present Illness               Objective:     Past Medical History:    Abdominal pain, epigastric    Acute appendicitis without mention of peritonitis    Candidiasis of mouth    Contact dermatitis and other eczema, due to unspecified cause    Disorder of bone and cartilage, unspecified    GERD (gastroesophageal reflux disease)    Impaired glucose tolerance test    Osteopenia    Peptic ulcer disease    Peptic ulcer disease    Pure hypercholesterolemia    Ranula of floor of mouth    Routine general medical examination at a health care facility    Routine gynecological examination    Special screening for malignant neoplasms, colon    Special screening for malignant neoplasms, colon    Tachypnea    Ulcer    Vertigo              Past Surgical History:   Procedure Laterality Date    Appendectomy      Laparoscopic appendectomy  12/26/2010    Laparoscopy,diagnostic      per pt. \"for gynecologic reasons\"    Other surgical history  9/15/2006    \"ranula? removal\" per [facility]    Other surgical history  12/2010    drainage of intraabdominal ascess                Social History     Socioeconomic History    Marital status:    Tobacco Use    Smoking status: Never     Passive exposure: Never    Smokeless tobacco: Never   Vaping Use    Vaping status: Never Used    Substance and Sexual Activity    Alcohol use: Yes     Comment: cage done 5/23/19    Drug use: No    Sexual activity: Not Currently   Other Topics Concern    Caffeine Concern Yes     Comment: 2 c a day    Exercise Yes                                Physical Exam     ED Triage Vitals [04/28/25 2229]   /80   Pulse 65   Resp 20   Temp 97.8 °F (36.6 °C)   Temp src Oral   SpO2 97 %   O2 Device None (Room air)       Current Vitals:   Vital Signs  BP: 131/74  Pulse: 63  Resp: 20  Temp: 97.8 °F (36.6 °C)  Temp src: Oral  MAP (mmHg): 89    Oxygen Therapy  SpO2: 100 %  O2 Device: None (Room air)        Physical Exam  GENERAL: No acute distress, well appearing and non-toxic, Alert and oriented X 3   HEENT: Normocephalic, atraumatic.  Moist mucous membranes.  Pupils equal round reactive to light and accommodation, extraocular motion is intact, sclerae white, conjunctiva is pink.  Oropharynx is unremarkable, no exudate.  NECK: Supple, trachea midline, no lymphadenopathy.   LUNG: Lungs clear to auscultation bilaterally, no wheezing, no rales, no rhonchi.  CARDIOVASCULAR: Regular rate and rhythm.  Normal S1S2.  No S3S4 or murmur.  ABDOMEN: Bowel sounds are present. Soft. nondistended, no pulsatile masses. nontender  MUSCULOSKELETAL: No calf tenderness.  Dorsalis and Posterior Tibial pulses present. No clubbing. No cyanosis.  No edema.   SKIN EXAMINATIoN: Warm and dry with normal appearance.  No rashes or lesions.  NEUROLOGICAL:  Motor strength intact all groups.  normal sensation, speech intact  Physical Exam                ED Course     Labs Reviewed   COMP METABOLIC PANEL (14) - Abnormal; Notable for the following components:       Result Value    Glucose 111 (*)     Potassium 3.4 (*)     Calcium, Total 11.9 (*)     All other components within normal limits   CBC WITH DIFFERENTIAL WITH PLATELET - Abnormal; Notable for the following components:    Lymphocyte Absolute 4.09 (*)     All other components within normal limits    LIPASE - Normal     EKG    Rate, intervals and axes as noted on EKG Report.  Rate: 61  Rhythm: Sinus Rhythm  Reading: No acute changes              Results           I personally reviewd CT images of abdomen and pelvis and independent interpretation shows no obvious bowel obstruction.  I also viewed formal radiology report as read by radiology with findings below:    CT of abdomen and pelvis read by [facility] rad radiology shows   No acute intra-abdominal pathology. No bowel obstruction or inflammation. Appendix is normal. No hydronephrosis or nephrolithiasis. No free air or free fluid.  Gallbladder and pancreas are unremarkable.  Bilateral adnexal cysts.         Medications   iopamidol 76% (ISOVUE-370) injection for power injector (65 mL Intravenous Given 4/29/25 0051)                     MDM      Patient 59-year-old female presents to ED for evaluation of colicky abdominal pain.  Differential hepatitis, pancreatitis, biliary colic, bowel obstruction.  Laboratory test obtained showing potassium 3.4.  LFTs, lipase unremarkable.  CT abdomen pelvis showed no acute pathology.  I attempted to perform limited bedside ultrasound performed by myself but gallbladder difficult to visualize.  This is the first time symptoms have happened the patient.  Do not think formal gallbladder ultrasound indicated here but if symptoms recur at home, patient may need outpatient gallbladder ultrasound.  She was given Bentyl at home.  Given distal esophageal wall thickening, recommend Prilosec.    Patient was screened and evaluated during this visit.   As a treating physician attending to the patient, I determined, within reasonable clinical confidence and prior to discharge, that an emergency medical condition was not or was no longer present.  There was no indication for further evaluation, treatment or admission on an emergency basis.  Comprehensive verbal and written discharge and follow-up instructions were provided to help prevent  relapse or worsening.  Patient was instructed to follow-up with her primary care provider for further evaluation and treatment, but to return immediately to the ER for worsening, concerning, new, changing or persisting symptoms.  I discussed the case with the patient and they had no questions, complaints, or concerns.  Patient felt comfortable going home.            Medical Decision Making      Disposition and Plan     Clinical Impression:  1. Abdominal pain of unknown etiology    2. Hypokalemia         Disposition:  Discharge  4/29/2025  2:01 am    Follow-up:  Ruthann Nunez DO  [address] [place] [id number]  [phone]    Follow up  As needed          Medications Prescribed:  Discharge Medication List as of 4/29/2025  2:04 AM        START taking these medications    Details   Omeprazole 40 MG Oral Capsule Delayed Release Take 1 capsule (40 mg total) by mouth daily., Normal, Disp-30 capsule, R-0      dicyclomine 20 MG Oral Tab Take 1 tablet (20 mg total) by mouth 3 (three) times daily., Normal, Disp-20 tablet, R-0             Supplementary Documentation:

## 2025-04-29 NOTE — ED QUICK NOTES
Patient hooked up to monitor and went over assessment, Let patient know with abdominal pain we will get an IV started and get some blood work. Pt  stated \"Shouldn't we just wait for the doctor to examine her prior to doing blood work?\" Explained process and with complaint of abdominal pain and blood work to help isolate some concerns. Patient  stated they would like for doctor to do an assessment to see if IV and blood work is needed, pt agreeable. EDMD aware.

## 2025-04-29 NOTE — DISCHARGE INSTRUCTIONS
Begin Prilosec daily    Dicyclomine for pain    CT scan shows distal esophageal wall thickening.  Otherwise unremarkable

## (undated) NOTE — LETTER
19    Patient: Leila Redding  : 1965 Visit date: 2019    Dear  Dr. Apple Chavez, DO,    Thank you for referring Leila Redding to my practice. Please find my assessment and plan below.              Assessment   Screening for colon cance peritonitis. Liver and spleen are nonpalpable. There are no palpable masses or hernias. The patient will be scheduled to undergo a colonoscopy at [facility] on October 1, 2019.   Sincerely,     Lorne Pepper MD   CC:

## (undated) NOTE — LETTER
10/01/19        Carlton Caban  [address] [facility] [address]      Dear Antoine [place],    [address] [facility] records indicate that you have outstanding lab work and or testing that was ordered for you and has not yet been completed:  Orders Placed This Encounter

## (undated) NOTE — ED AVS SNAPSHOT
[facility] Emergency Department    [place]  [address]    [place] [address]    Phone:  [phone]    Fax:  [phone] [facility]   MRN: [id number]    Department:  [facility] Emergency Department   Date of Visit:  4/8 IF THERE IS ANY CHANGE OR WORSENING OF YOUR CONDITION, CALL YOUR PRIMARY CARE PHYSICIAN AT ONCE OR RETURN IMMEDIATELY TO THE EMERGENCY DEPARTMENT.     If you have been prescribed any medication(s), please fill your prescription right away and begin taking t

## (undated) NOTE — LETTER
11/12/19        Lissette Carter  [address] [facility] [address]      Dear Nevin Carter,    [address] [facility] records indicate that you have outstanding lab work and or testing that was ordered for you and has not yet been completed:  Orders Placed This Encounter

## (undated) NOTE — ED AVS SNAPSHOT
[facility] Emergency Department    [place]  [address]    [place] [address]    Phone:  [phone]    Fax:  [phone] [facility]   MRN: [id number]    Department:  [facility] Emergency Department   Date of Visit:  4/8 To Check ER Wait Times:  TEXT 'ERwait' to 25093      Click [email or web address]      Or call [phone]    If you have any problems with your follow-up, please call our  at [phone]    Si usted tiene algun problema con more sequimiento, por f I have read and understand the instructions given to me by my caregivers. [facility]        Pharmacy Address Phone Number   [phone] ([address]) [place] PROCEDURE:  XR RIBS WITH CHEST (3 VIEWS), LEFT  (CPT=71101)     TECHNIQUE:  PA Chest and three views of the ribs were obtained     COMPARISON:  None.      INDICATIONS:  rib pain     PATIENT STATED HISTORY: (As transcribed by Technologist)  Patient was [employer]